# Patient Record
Sex: MALE | Race: WHITE | NOT HISPANIC OR LATINO | URBAN - METROPOLITAN AREA
[De-identification: names, ages, dates, MRNs, and addresses within clinical notes are randomized per-mention and may not be internally consistent; named-entity substitution may affect disease eponyms.]

---

## 2021-01-09 ENCOUNTER — EMERGENCY (EMERGENCY)
Facility: HOSPITAL | Age: 61
LOS: 1 days | Discharge: HOME | End: 2021-01-09
Attending: STUDENT IN AN ORGANIZED HEALTH CARE EDUCATION/TRAINING PROGRAM
Payer: COMMERCIAL

## 2021-01-09 VITALS
HEART RATE: 66 BPM | TEMPERATURE: 99 F | SYSTOLIC BLOOD PRESSURE: 150 MMHG | OXYGEN SATURATION: 100 % | DIASTOLIC BLOOD PRESSURE: 88 MMHG | RESPIRATION RATE: 16 BRPM

## 2021-01-09 VITALS
RESPIRATION RATE: 18 BRPM | TEMPERATURE: 98 F | OXYGEN SATURATION: 99 % | HEIGHT: 74 IN | HEART RATE: 67 BPM | SYSTOLIC BLOOD PRESSURE: 145 MMHG | DIASTOLIC BLOOD PRESSURE: 84 MMHG | WEIGHT: 190.04 LBS

## 2021-01-09 PROCEDURE — 72220 X-RAY EXAM SACRUM TAILBONE: CPT | Mod: 26

## 2021-01-09 PROCEDURE — 99284 EMERGENCY DEPT VISIT MOD MDM: CPT

## 2021-01-09 PROCEDURE — 72220 X-RAY EXAM SACRUM TAILBONE: CPT

## 2021-01-09 PROCEDURE — 72170 X-RAY EXAM OF PELVIS: CPT | Mod: 26

## 2021-01-09 PROCEDURE — 72170 X-RAY EXAM OF PELVIS: CPT

## 2021-01-09 RX ORDER — ACETAMINOPHEN 500 MG
975 TABLET ORAL ONCE
Refills: 0 | Status: COMPLETED | OUTPATIENT
Start: 2021-01-09 | End: 2021-01-09

## 2021-01-09 RX ORDER — LIDOCAINE 4 G/100G
1 CREAM TOPICAL ONCE
Refills: 0 | Status: COMPLETED | OUTPATIENT
Start: 2021-01-09 | End: 2021-01-09

## 2021-01-09 RX ORDER — IBUPROFEN 200 MG
600 TABLET ORAL ONCE
Refills: 0 | Status: COMPLETED | OUTPATIENT
Start: 2021-01-09 | End: 2021-01-09

## 2021-01-09 RX ADMIN — Medication 600 MILLIGRAM(S): at 19:45

## 2021-01-09 RX ADMIN — Medication 975 MILLIGRAM(S): at 19:45

## 2021-01-09 RX ADMIN — LIDOCAINE 1 PATCH: 4 CREAM TOPICAL at 19:45

## 2021-01-09 NOTE — ED ADULT NURSE NOTE - OBJECTIVE STATEMENT
60y male from triage complaining of lower back pain, pt is status post accidental trip/fall off ladder onto rock, denies hitting head, no LOC, unable to move for 5 min after but able to ambulate since, PMH HTN, no other symptoms, moves all extremities with + pulses, abd soft and nontender, so chest pain, shortness of breath, CTAB, bed in lowest position, call bell in reach, comfort and safety provided.

## 2021-01-09 NOTE — ED PROVIDER NOTE - NSFOLLOWUPINSTRUCTIONS_ED_ALL_ED_FT
Contusion    A contusion is a deep bruise. Contusions are the result of a blunt injury to tissues and muscle fibers under the skin. The skin overlying the contusion may turn blue, purple, or yellow. Symptoms also include pain and swelling in the injured area.    SEEK IMMEDIATE MEDICAL CARE IF YOU HAVE ANY OF THE FOLLOWING SYMPTOMS: severe pain, numbness, tingling, pain, weakness, or skin color/temperature change in any part of your body distal to the injury.     Take acetaminophen (Tylenol) 975mg (3 regular strength tablets or 2 extra strength tablets) as often as every 6 hours as needed for pain. Never take more than 4000mg of acetaminophen/Tylenol in any 24 hour period. Be cautious of over the counter medications as many formularies contain acetaminophen in them. Take ibuprofen (or Motrin) 600mg (3 tablets) up to 4 times per day as needed for pain with food or milk.

## 2021-01-09 NOTE — ED PROVIDER NOTE - PATIENT PORTAL LINK FT
You can access the FollowMyHealth Patient Portal offered by [facility] by registering at the following website: [email or web address]. By joining [facility]’s FollowMyHealth portal, you will also be able to view your health information using other applications (apps) compatible with our system.

## 2021-01-09 NOTE — ED PROVIDER NOTE - CLINICAL SUMMARY MEDICAL DECISION MAKING FREE TEXT BOX
Ole Kapoor MD: 59 yo M HTN p/w with mid lower back pain s/p fall x 1 hr ago. Pt states that he was getting off his ladder and on the last step he fell backwards and landed on a rock. TTP coccyx, will r/o fx with pelvis xray. pain control

## 2021-01-09 NOTE — ED PROVIDER NOTE - OBJECTIVE STATEMENT
Ole Kapoor MD: 59 yo M HTN p/w with mid lower back pain s/p fall x 1 hr ago. Pt states that he was getting off his ladder and on the last step he fell backwards and landed on a rock. Pt denies head trauma, LOC n/v. Pt also reports that he was ambulatory at the scene. Pain aggravated by touch and movement. pt reports that he did not take any for the pain. Denies weakness, numbness, CP, SOB

## 2021-01-09 NOTE — ED ADULT NURSE NOTE - CHIEF COMPLAINT QUOTE
fell of 1st step of the ladder at about 1pm.  C/o lower back/ coccyx pain. Pt ambulatory.  Denies  head injury, LOC, blood thinners.  Denies helmet use

## 2021-01-09 NOTE — ED PROVIDER NOTE - NS ED ROS FT
GENERAL: No fever or chills, EYES: no change in vision, HEENT: no trouble speaking, CARDIAC: no chest pain, palpitation PULMONARY: no cough or SOB, GI: no abdominal pain, no nausea, no vomiting, no diarrhea or constipation, : No changes in urination, SKIN: no rashes, NEURO: no headache,  MSK: +back pain ~Ole Kapoor MD

## 2021-01-09 NOTE — ED ADULT TRIAGE NOTE - CHIEF COMPLAINT QUOTE
fell of 1st step of the ladder at about 1pm.  C/o lower back/ coccyx pain.  Denies  head injury, LOC, blood thinners.  Denies helmet use fell of 1st step of the ladder at about 1pm.  C/o lower back/ coccyx pain. Pt ambulatory.  Denies  head injury, LOC, blood thinners.  Denies helmet use

## 2021-01-09 NOTE — ED PROVIDER NOTE - ATTENDING CONTRIBUTION TO CARE
60M hx of htn on enalapril 2.5mg presenting with fall from bottom rung of a ladder, fell backwards onto his coccyx, now with point ttp overlying coccyx, no skin injuries, will check xr, provide analgesics, follow up studies, reassess, dispo.

## 2021-01-09 NOTE — ED PROVIDER NOTE - RAPID ASSESSMENT
fall from ladder. no red flag symptoms. ambulatory. declines tyl    **pt seen in waiting room for MD triage - comprehensive history and physical is not performed by me - patient to be sent to main ED for full history / physcial and medical evaluation - all orders placed by me to be followed by MD in main ED**

## 2021-01-09 NOTE — ED PROVIDER NOTE - NSFOLLOWUPCLINICS_GEN_ALL_ED_FT
[facility]  Sports Medicine  [address] [place] [address]  Phone: [phone]  Fax:   Follow Up Time: 1-3 Days

## 2021-01-09 NOTE — ED PROVIDER NOTE - PHYSICAL EXAMINATION
Gen: AAOx3, non-toxic  Head: NCAT  HEENT: EOMI, PERRLA, oral mucosa moist, normal conjunctiva  Lung: CTAB, no respiratory distress, no wheezes/rhonchi/rales B/L, speaking in full sentences  CV: RRR, no murmurs, rubs or gallops  Abd: soft, NTND, no guarding, no CVA tenderness, no rebound tenderness  MSK: no visible deformities, full range of motion of all 4 exts, TTP pf the coccyx midline  Neuro: No focal sensory or motor deficits  Skin: Warm, well perfused, no rash  Psych: normal affect.   ~Ole Kapoor MD

## 2021-01-11 NOTE — ED POST DISCHARGE NOTE - DETAILS
called patient, feeling much better 2/10 pain with NSAIDS, recommended ortho follow up if there is persistent or worsening pain and given number of clinic and on call physician - Madai Bagley PA-C